# Patient Record
Sex: MALE | Race: WHITE | NOT HISPANIC OR LATINO
[De-identification: names, ages, dates, MRNs, and addresses within clinical notes are randomized per-mention and may not be internally consistent; named-entity substitution may affect disease eponyms.]

---
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
## 2023-01-05 VITALS — WEIGHT: 152 LBS | HEIGHT: 72.99 IN | BODY MASS INDEX: 20.15 KG/M2

## 2024-01-03 VITALS — HEIGHT: 72.99 IN | BODY MASS INDEX: 20.67 KG/M2 | WEIGHT: 156 LBS

## 2024-09-08 ENCOUNTER — NON-APPOINTMENT (OUTPATIENT)
Age: 19
End: 2024-09-08